# Patient Record
Sex: MALE | Race: WHITE
[De-identification: names, ages, dates, MRNs, and addresses within clinical notes are randomized per-mention and may not be internally consistent; named-entity substitution may affect disease eponyms.]

---

## 2018-04-25 ENCOUNTER — HOSPITAL ENCOUNTER (EMERGENCY)
Dept: HOSPITAL 31 - C.ER | Age: 48
Discharge: HOME | End: 2018-04-25
Payer: COMMERCIAL

## 2018-04-25 VITALS
SYSTOLIC BLOOD PRESSURE: 132 MMHG | DIASTOLIC BLOOD PRESSURE: 82 MMHG | RESPIRATION RATE: 20 BRPM | HEART RATE: 86 BPM | OXYGEN SATURATION: 99 % | TEMPERATURE: 98.2 F

## 2018-04-25 VITALS — BODY MASS INDEX: 30.1 KG/M2
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
## 2018-04-25 DIAGNOSIS — S39.012A: Primary | ICD-10-CM
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
## 2018-04-25 NOTE — C.PDOC
History Of Present Illness


48yo male, presents to ED for evaluation of left sided lower back pain, 

gradually worsening over the past 2 days. Patient states he "pulled a muscle" 

at work and states he lifts heavy objects at work. He states the pain is 

localized, non-radiating and worse with movement. He denies known direct trauma 

or injuries, abdominal pain, hematuria, frequency, bowel or bladder dysfunction

, weakness in his bilateral lower extremities. Patient has no other medical 

complaints. Ambulate to ED for evaluation, not in any apparent distress.


Time Seen by Provider: 04/25/18 09:33


Chief Complaint (Nursing): Back Pain


History Per: Patient


History/Exam Limitations: no limitations


Onset/Duration Of Symptoms: Days (2), Gradual


Current Symptoms Are (Timing): Still Present


Quality Of Discomfort: "Pain"


Previous Symptoms: None


Associated Symptoms: denies: Incontinence, New Weakness, New Numbness


Exacerbating Factor(s): Movement





Past Medical History


Reviewed: Historical Data, Nursing Documentation, Vital Signs


Vital Signs: 


 Last Vital Signs











Temp  98.2 F   04/25/18 09:45


 


Pulse  86   04/25/18 09:45


 


Resp  20   04/25/18 09:45


 


BP  132/82   04/25/18 09:45


 


Pulse Ox  99   04/25/18 11:01














- Medical History


PMH: HTN


Surgical History: 


   Denies: Pacemaker





- [facility] Procedures








ESOPHAGOGASTRODUODENOSCOPY [EGD] W/CLOSED BIOPSY (07/10/15)








Family History: States: Unknown Family Hx





- Social History


Hx Tobacco Use: No


Hx Alcohol Use: Yes (SOCIALLY- BEER)


Hx Substance Use: No





- Immunization History


Hx Tetanus Toxoid Vaccination: No


Hx Influenza Vaccination: Yes (UTD)


Hx Pneumococcal Vaccination: No





Review Of Systems


Except As Marked, All Systems Reviewed And Found Negative.


Gastrointestinal: Negative for: Abdominal Pain


Genitourinary: Negative for: Frequency, Incontinence, Hematuria


Musculoskeletal: Positive for: Back Pain


Neurological: Negative for: Weakness





Physical Exam





- Physical Exam


Appears: Non-toxic, No Acute Distress


Skin: Normal Color, Warm, Dry


Head: Normacephalic


Eye(s): bilateral: PERRL


Nose: No Flaring


Oral Mucosa: Moist


Neck: Trachea Midline, Supple


Chest: Symmetrical


Cardiovascular: Rhythm Regular


Respiratory: No Decreased Breath Sounds, No Accessory Muscle Use, No Stridor, 

No Wheezing


Gastrointestinal/Abdominal: Soft, No Tenderness, No Distention, No Guarding


Back: No CVA Tenderness, No Vertebral Tenderness, Muscle Spasm (Left lumbar 

paraspinal), Paraspinal Tenderness (left paralumbar tenderness)


Extremity: Normal ROM, No Deformity, No Swelling


Neurological/Psych: Oriented x3, Normal Speech, Normal Motor, Normal Sensation, 

Normal Reflexes





ED Course And Treatment


O2 Sat by Pulse Oximetry: 99 (RA)


Pulse Ox Interpretation: Normal


Progress Note: On re-evaluation, pt is afebrile, hemodynamicaly stable.  

Ambulatory in ED with stable gait.  ENT: no acute findings.  Neck: Supple.  Abd

: benign, (-) guarding, (-) rebound.  Back: (-) CVA tenderness.  Neurologicaly 

intact.  Pt has clinical findings c/w Right sided lower back strain.  Pt 

advised.  REf. to f/u with PMD in 2 days for re-eval.  return to ED if any 

worsening or new changes.





Disposition


Counseled Patient/Family Regarding: Diagnosis, Need For Followup, Rx Given





- Disposition


Referrals: 


Gabrielle Ortega DO [Staff Provider] - 


Disposition: HOME/ ROUTINE


Disposition Time: 10:08


Condition: STABLE


Additional Instructions: 


Light duty to lower back


Avoid bending over, heavy lifting for 1 week





Take pain medication as prescribed as need


Follow up with PMD in 2 days for re-evaluation.


Prescriptions: 


Gabapentin [Neurontin] 300 mg PO HS #10 cap


Ibuprofen [Motrin Tab] 600 mg PO Q6 #14 tab


Methocarbamol [Robaxin] 500 mg PO TID #14 tab


Instructions:  Back Exercises, Lumbar Muscle Strain (DC)


Forms:  [facility] (English)





- Clinical Impression


Clinical Impression: 


 Low back strain








- PA / NP / Resident Statement


MD/ has reviewed & agrees with the documentation as recorded.





- Scribe Statement


The provider has reviewed the documentation as recorded by the Scribe (Brittany Morejon)


Provider Attestation:


All medical record entries made by the Scribe were at my direction and 

personally dictated by me. I have reviewed the chart and agree that the record 

accurately reflects my personal performance of the history, physical exam, 

medical decision making, and the department course for this patient. I have 

also personally directed, reviewed, and agree with the discharge instructions 

and disposition.